# Patient Record
Sex: FEMALE | Race: OTHER | Employment: OTHER | URBAN - METROPOLITAN AREA
[De-identification: names, ages, dates, MRNs, and addresses within clinical notes are randomized per-mention and may not be internally consistent; named-entity substitution may affect disease eponyms.]

---

## 2021-05-10 ENCOUNTER — HOSPITAL ENCOUNTER (EMERGENCY)
Facility: HOSPITAL | Age: 56
Discharge: HOME | End: 2021-05-10
Attending: EMERGENCY MEDICINE
Payer: MEDICARE

## 2021-05-10 VITALS
OXYGEN SATURATION: 96 % | SYSTOLIC BLOOD PRESSURE: 136 MMHG | TEMPERATURE: 97 F | HEART RATE: 68 BPM | DIASTOLIC BLOOD PRESSURE: 78 MMHG | RESPIRATION RATE: 18 BRPM

## 2021-05-10 DIAGNOSIS — K61.0 PERIANAL ABSCESS: Primary | ICD-10-CM

## 2021-05-10 PROCEDURE — 99283 EMERGENCY DEPT VISIT LOW MDM: CPT

## 2021-05-10 PROCEDURE — 46050 I&D PERIANAL ABSCESS SUPFC: CPT

## 2021-05-10 RX ORDER — CEPHALEXIN 500 MG/1
500 CAPSULE ORAL 2 TIMES DAILY
Qty: 14 CAPSULE | Refills: 0 | Status: SHIPPED | OUTPATIENT
Start: 2021-05-10 | End: 2021-05-17

## 2021-05-10 RX ORDER — IBUPROFEN 600 MG/1
600 TABLET ORAL EVERY 8 HOURS PRN
Qty: 30 TABLET | Refills: 0 | Status: SHIPPED | OUTPATIENT
Start: 2021-05-10 | End: 2021-05-17

## 2021-05-10 NOTE — ED PROVIDER NOTES
Patient Seen in: [facility] AND [facility] Emergency Department      History   Patient presents with:  Rash Skin Problem    Stated Complaint: lumps on abdomen    HPI/Subjective:   HPI    58-year-old female with history of COPD, seizures, here with complaints of reviewed and negative except as noted above.     Physical Exam     ED Triage Vitals [05/10/21 1310]   /86   Pulse 72   Resp 18   Temp 97 °F (36.1 °C)   Temp src Temporal   SpO2 97 %   O2 Device None (Room air)       Current:/86   Pulse 72   Temp obtained. Loculations disrupted. no sterile packing was inserted. No complications were noted. Patient instructed to follow up with their PMD or return to the ED for wound check in 3-5 days.     DIAGNOSTICS:   Labs:  No results found for this or any Disposition and Plan     Clinical Impression:  Perianal abscess  (primary encounter diagnosis)     Disposition:  Discharge  5/10/2021  2:57 pm    Follow-up:  Keren Solis MD  [address]  [phone]    Schedule an appointm